# Patient Record
Sex: MALE | Race: WHITE
[De-identification: names, ages, dates, MRNs, and addresses within clinical notes are randomized per-mention and may not be internally consistent; named-entity substitution may affect disease eponyms.]

---

## 2019-06-15 ENCOUNTER — HOSPITAL ENCOUNTER (EMERGENCY)
Dept: HOSPITAL 10 - E/R | Age: 39
Discharge: HOME | End: 2019-06-15
Payer: MEDICAID

## 2019-06-15 ENCOUNTER — HOSPITAL ENCOUNTER (EMERGENCY)
Dept: HOSPITAL 91 - E/R | Age: 39
Discharge: HOME | End: 2019-06-15
Payer: MEDICAID

## 2019-06-15 VITALS
BODY MASS INDEX: 28.65 KG/M2 | HEIGHT: 60 IN | HEIGHT: 60 IN | BODY MASS INDEX: 28.65 KG/M2 | WEIGHT: 145.95 LBS | WEIGHT: 145.95 LBS

## 2019-06-15 VITALS — SYSTOLIC BLOOD PRESSURE: 129 MMHG | HEART RATE: 104 BPM | DIASTOLIC BLOOD PRESSURE: 77 MMHG | RESPIRATION RATE: 20 BRPM

## 2019-06-15 DIAGNOSIS — F29: ICD-10-CM

## 2019-06-15 DIAGNOSIS — L03.115: Primary | ICD-10-CM

## 2019-06-15 LAB
ACETAMINOPHEN: < 10 UG/ML (ref 10–30)
ADD MAN DIFF?: NO
ADD UMIC: NO
ALANINE AMINOTRANSFERASE: 20 IU/L (ref 13–69)
ALBUMIN/GLOBULIN RATIO: 1.25
ALBUMIN: 3.9 G/DL (ref 3.3–4.9)
ALKALINE PHOSPHATASE: 52 IU/L (ref 42–121)
ANION GAP: 7 (ref 5–13)
ASPARTATE AMINO TRANSFERASE: 24 IU/L (ref 15–46)
BASOPHIL #: 0.1 10^3/UL (ref 0–0.1)
BASOPHILS %: 0.6 % (ref 0–2)
BILIRUBIN,DIRECT: 0 MG/DL (ref 0–0.2)
BILIRUBIN,TOTAL: 0.3 MG/DL (ref 0.2–1.3)
BLOOD UREA NITROGEN: 14 MG/DL (ref 7–20)
CALCIUM: 9 MG/DL (ref 8.4–10.2)
CARBON DIOXIDE: 31 MMOL/L (ref 21–31)
CHLORIDE: 99 MMOL/L (ref 97–110)
CREATININE: 1.05 MG/DL (ref 0.61–1.24)
EOSINOPHILS #: 0.2 10^3/UL (ref 0–0.5)
EOSINOPHILS %: 1.5 % (ref 0–7)
ETHANOL: < 10 MG/DL (ref 0–0)
GLOBULIN: 3.1 G/DL (ref 1.3–3.2)
GLUCOSE: 107 MG/DL (ref 70–220)
HEMATOCRIT: 37.4 % (ref 42–52)
HEMOGLOBIN: 12.1 G/DL (ref 14–18)
IMMATURE GRANS #M: 0.04 10^3/UL (ref 0–0.03)
IMMATURE GRANS % (M): 0.3 % (ref 0–0.43)
LYMPHOCYTES #: 1.1 10^3/UL (ref 0.8–2.9)
LYMPHOCYTES %: 9.3 % (ref 15–51)
MEAN CORPUSCULAR HEMOGLOBIN: 28.8 PG (ref 29–33)
MEAN CORPUSCULAR HGB CONC: 32.4 G/DL (ref 32–37)
MEAN CORPUSCULAR VOLUME: 89 FL (ref 82–101)
MEAN PLATELET VOLUME: 9.2 FL (ref 7.4–10.4)
MONOCYTE #: 1.3 10^3/UL (ref 0.3–0.9)
MONOCYTES %: 11.4 % (ref 0–11)
NEUTROPHIL #: 9 10^3/UL (ref 1.6–7.5)
NEUTROPHILS %: 76.9 % (ref 39–77)
NUCLEATED RED BLOOD CELLS #: 0 10^3/UL (ref 0–0)
NUCLEATED RED BLOOD CELLS%: 0 /100WBC (ref 0–0)
PLATELET COUNT: 287 10^3/UL (ref 140–415)
POTASSIUM: 4.1 MMOL/L (ref 3.5–5.1)
RED BLOOD COUNT: 4.2 10^6/UL (ref 4.7–6.1)
RED CELL DISTRIBUTION WIDTH: 12.9 % (ref 11.5–14.5)
SALICYLATE: < 1 MG/DL (ref 5–30)
SODIUM: 137 MMOL/L (ref 135–144)
TOTAL PROTEIN: 7 G/DL (ref 6.1–8.1)
UR ASCORBIC ACID: NEGATIVE MG/DL
UR BILIRUBIN (DIP): NEGATIVE MG/DL
UR BLOOD (DIP): NEGATIVE MG/DL
UR CLARITY: CLEAR
UR COLOR: YELLOW
UR GLUCOSE (DIP): NEGATIVE MG/DL
UR KETONES (DIP): NEGATIVE MG/DL
UR LEUKOCYTE ESTERASE (DIP): NEGATIVE LEU/UL
UR NITRITE (DIP): NEGATIVE MG/DL
UR PH (DIP): 7 (ref 5–9)
UR SPECIFIC GRAVITY (DIP): 1.01 (ref 1–1.03)
UR TOTAL PROTEIN (DIP): NEGATIVE MG/DL
UR UROBILINOGEN (DIP): NEGATIVE MG/DL
WHITE BLOOD COUNT: 11.7 10^3/UL (ref 4.8–10.8)

## 2019-06-15 PROCEDURE — 85025 COMPLETE CBC W/AUTO DIFF WBC: CPT

## 2019-06-15 PROCEDURE — 36415 COLL VENOUS BLD VENIPUNCTURE: CPT

## 2019-06-15 PROCEDURE — 80307 DRUG TEST PRSMV CHEM ANLYZR: CPT

## 2019-06-15 PROCEDURE — 81003 URINALYSIS AUTO W/O SCOPE: CPT

## 2019-06-15 PROCEDURE — 99285 EMERGENCY DEPT VISIT HI MDM: CPT

## 2019-06-15 PROCEDURE — 80053 COMPREHEN METABOLIC PANEL: CPT

## 2019-06-15 RX ADMIN — SULFAMETHOXAZOLE AND TRIMETHOPRIM 1 TAB: 800; 160 TABLET ORAL at 19:42

## 2019-06-15 RX ADMIN — OLANZAPINE 1 MG: 5 TABLET, ORALLY DISINTEGRATING ORAL at 19:42

## 2019-06-15 RX ADMIN — CEPHALEXIN 1 MG: 500 CAPSULE ORAL at 19:42

## 2019-06-15 RX ADMIN — ACETAMINOPHEN 1 MG: 325 TABLET, FILM COATED ORAL at 21:37

## 2019-06-15 NOTE — ERD
ER Documentation


Chief Complaint


Chief Complaint





STATES ELCTRIC SHOCKS WITH USED CAR FOR THE LAST 5 MONTHS, SEE NOTES





HPI


Patient is a 38-year-old male who presents saying that he feels electrical 


shocks from his used car.  He has had the symptoms for the past 5 months.  He 


feels electrical shocks in his left upper abdomen.  He denies suicidal or 


homicidal ideation.  He also says that he has right-sided knee pain and redness 


that started a few days ago.  Upon review of old medical record the patient has 


multiple visits to the ER since 2013.  Review of the emergency department 


information exchange system shows visits to 2 separate emergency departments for


a total of 5 visits over the past 1 year.  He does not currently have a primary 


doctor.





ROS


All systems reviewed and are negative except as per history of present illness.





Medications


Home Meds


Active Scripts


Olanzapine* (Zyprexa*) 5 Mg Tablet, 5 MG PO DAILY, #14 TAB


   Prov:NIURKA GRACIA MD         6/15/19


Cephalexin* (Keflex*) 500 Mg Capsule, 500 MG PO QID for 7 Days, CAP


   Prov:NIURKA GARCIA MD         6/15/19


Sulfamethoxazole/Trimethoprim* (Bactrim Ds* Tablet) 1 Each Tablet, 1 TAB PO BID,


#14 TAB


   Prov:NIURKA GARCIA MD         6/15/19


Reported Medications


[Lorazapam]   No Conflict Check


   9/21/13


[None]   No Conflict Check


   8/11/13





Allergies


Allergies:  


Coded Allergies:  


     No Known Allergy (Unverified , 9/21/13)





PMhx/Soc


Medical and Surgical Hx:  pt denies Medical Hx, pt denies Surgical Hx


History of Surgery:  No


Anesthesia Reaction:  No


Hx Neurological Disorder:  No


Hx Respiratory Disorders:  No


Hx Cardiac Disorders:  No


Hx Psychiatric Problems:  Yes (ANXIETY)


Hx Miscellaneous Medical Probl:  No


Hx Alcohol Use:  No (denies )


Hx Substance Use:  No (denies)


Hx Tobacco Use:  No (denies )


Smoking Status:  Never smoker





FmHx


Family History:  diabetes





Physical Exam


Vitals





Vital Signs


  Date      Temp  Pulse  Resp  B/P (MAP)   Pulse Ox  O2          O2 Flow    FiO2


Time                                                 Delivery    Rate


   6/15/19  99.4    107    20      143/88        98


     19:20                          (106)





Physical Exam


Const:   No acute distress


Head:   Atraumatic 


Eyes:    Normal Conjunctiva


ENT:    Normal External Ears, Nose and Mouth.


Neck:               Full range of motion. No meningismus.


Resp:   Clear to auscultation bilaterally


Cardio:   Regular rate and rhythm, no murmurs


Abd:    Soft, non tender, non distended. Normal bowel sounds


Skin:   Cellulitis in the inferior portion of the right knee without signs of 


joint infection, patient has full range of motion without pain and no joint 


effusion


Back:   No midline or flank tenderness


Ext:    No cyanosis, or edema


Neur:   Awake and alert


Psych:    Flight of ideas and delusions but no suicidal or homicidal ideation


Result Diagram:  


6/15/19 1946                                                                    


            6/15/19 1946





Results 24 hrs





Laboratory Tests


              Test
                                  6/15/19
19:46


              White Blood Count                      11.7 10^3/ul


              Red Blood Count                        4.20 10^6/ul


              Hemoglobin                                12.1 g/dl


              Hematocrit                                   37.4 %


              Mean Corpuscular Volume                     89.0 fl


              Mean Corpuscular Hemoglobin                 28.8 pg


              Mean Corpuscular Hemoglobin
Concent      32.4 g/dl 



              Red Cell Distribution Width                  12.9 %


              Platelet Count                          287 10^3/UL


              Mean Platelet Volume                         9.2 fl


              Immature Granulocytes %                     0.300 %


              Neutrophils %                                76.9 %


              Lymphocytes %                                 9.3 %


              Monocytes %                                  11.4 %


              Eosinophils %                                 1.5 %


              Basophils %                                   0.6 %


              Nucleated Red Blood Cells %             0.0 /100WBC


              Immature Granulocytes #               0.040 10^3/ul


              Neutrophils #                           9.0 10^3/ul


              Lymphocytes #                           1.1 10^3/ul


              Monocytes #                             1.3 10^3/ul


              Eosinophils #                           0.2 10^3/ul


              Basophils #                             0.1 10^3/ul


              Nucleated Red Blood Cells #             0.0 10^3/ul


              Urine Color                          YELLOW


              Urine Clarity                        CLEAR


              Urine pH                                        7.0


              Urine Specific Gravity                        1.015


              Urine Ketones                        NEGATIVE mg/dL


              Urine Nitrite                        NEGATIVE mg/dL


              Urine Bilirubin                      NEGATIVE mg/dL


              Urine Urobilinogen                   NEGATIVE mg/dL


              Urine Leukocyte Esterase
            NEGATIVE
Tala/ul


              Urine Hemoglobin                     NEGATIVE mg/dL


              Urine Glucose                        NEGATIVE mg/dL


              Urine Total Protein                  NEGATIVE mg/dl


              Sodium Level                             137 mmol/L


              Potassium Level                          4.1 mmol/L


              Chloride Level                            99 mmol/L


              Carbon Dioxide Level                      31 mmol/L


              Anion Gap                                         7


              Blood Urea Nitrogen                        14 mg/dl


              Creatinine                               1.05 mg/dl


              Est Glomerular Filtrat Rate
mL/min   > 60 mL/min 



              Glucose Level                             107 mg/dl


              Calcium Level                             9.0 mg/dl


              Total Bilirubin                           0.3 mg/dl


              Direct Bilirubin                         0.00 mg/dl


              Indirect Bilirubin                        0.3 mg/dl


              Aspartate Amino Transf
(AST/SGOT)          24 IU/L 



              Alanine Aminotransferase
(ALT/SGPT)        20 IU/L 



              Alkaline Phosphatase                        52 IU/L


              Total Protein                              7.0 g/dl


              Albumin                                    3.9 g/dl


              Globulin                                  3.10 g/dl


              Albumin/Globulin Ratio                         1.25


              Salicylates Level                    < 1.0 mg/dl


              Urine Opiates Screen                 Negative


              Acetaminophen Level                  < 10.0 ug/ml


              Urine Barbiturates                   Negative


              Urine Amphetamines Screen            Negative


              Urine Benzodiazepines Screen         Negative


              Urine Cocaine Screen                 Negative


              Urine Cannabinoids                   Negative


              Ethyl Alcohol Level                  < 10.0 mg/dl





Current Medications


 Medications
   Dose
          Sig/Rachael
       Start Time
   Status  Last


 (Trade)       Ordered        Route
 PRN     Stop Time              Admin
Dose


                              Reason                                Admin


 Cephalexin
    500 mg         QID
 PO
       6/15/19                    6/15/19


(Keflex)                                     19:35
                       19:42



                1 tab          BID
 PO
       6/15/19                    6/15/19


Trimethoprim/                                20:00
                       19:42







Sulfamethoxaz


ole



(Bactrim


(Ds))


 Olanzapine
    5 mg           ONCE  ONCE
    6/15/19       DC           6/15/19


(Zyprexa                      ODT
           20:00
                       19:42



Zydis)                                       6/15/19 20:01








Procedures/MDM


Patient is a 38-year-old male who presents with acute psychosis with delusions. 


 He was given Zyprexa.  He was seen by psychiatry who did not feel the patient 


requires a 5150 hold at this time.  He does not appear to be a danger to himself


 or to others.  He did have a cellulitis of the right knee and will be given 


Bactrim and Keflex for 1 week.  I do not believe this is an intra-articular 


infection.  The patient will need to follow-up within 48 hours for a wound 


check.  He will be given information for the local clinics.  He also be given 


information for the local psychiatric resources so that he can follow-up.  He 


will be given Zyprexa per psychiatry.  The patient could return for any worse


inder symptoms.





Departure


Diagnosis:  


   Primary Impression:  


   Psychosis


   Psychosis type:  unspecified psychosis type  Qualified Codes:  F29 - 


   Unspecified psychosis not due to a substance or known physiological condition


   Additional Impression:  


   Cellulitis


   Site of cellulitis:  extremity  Site of cellulitis of extremity:  lower 


   extremity  Laterality:  right  Qualified Codes:  L03.115 - Cellulitis of 


   right lower limb


Condition:  Fair


Patient Instructions:  Cellulitis, Psychosis


Referrals:  


[facility]  ([facility])


Usted se ha hecho un examen mdico de control que le indica que no est en fatmata 


condicin que requiera tratamiento urgente en el Departamento de Emergencia. Un 


estudio ms profundo y el tratamiento de boggs condicin pueden esperar sin ningn 


riesgo hasta que usted sea atendida/o en el consultorio de boggs mdico o fatmata cl


wai. Es responsabilidad suya arreglar fatmata kirti para el seguimiento del joann. 





MANEJO DE CONDICIONES NO URGENTES EN EL FUTURO


1) Si usted tiene un mdico de atencin primaria:





Usted debera llamar a boggs mdico de atencin primaria antes de venir al 


departamento de emergencia. Despus de las horas de consultorio, boggs doctor o boggs 


asociado/a est disponible por telfono. El mdico o enfermero de leonie en el 


servicio telefnico puede asesorarle por soledad medio para atender el problema, o 


joann contrario se puede programar fatmata kirti.





2) Si usted no tiene un mdico de atencin primaria:


Llame al mdico o clnica de referencia que aparece abajo missy las horas de 


consultorio para hacer fatmata kirti para que le vean.





CLINICAS:


[facility]  [phone] [address] [facility]  [phone] [address] [facility] [phone] [address] [facility]. [facility]  [phone] [address] [place] [phone] [address] [facility]. [phone] 


[address]





Additional Instructions:  


Llame al doctor MAANA y jakob fatmata KIRTI PARA DENTRO DE 1-2 EDWARDS.Dgale a la 


secretaria que nosotros le instruimos hacer esta kirti.Avise o llame si boggs condic


in se empeora antes de la kirti. Regresa aqui si peor o no mejor.











NIURKA GARCIA MD                [place] 15, 2019 20:56

## 2019-06-15 NOTE — PSY
Date/Time of Note


Date/Time of Note


DATE: 6/15/19 


TIME: 20:44





Psychiatric Subjective Eval


Consent


Pt consented to telemedicine:  Yes





Subjective Evaluation


Patient location:  emergency


Chief Complaint:  STATES ELCTRIC SHOCKS WITH USED CAR FOR THE LAST 5 MONTHS, SEE


NOTES


Reason for consult:  Hallucinations and delusions.


History of present illness


This is a 38 year old  [place]ic speaking male who presented to the ED by 


himself complaining that he was receiving messages from a car that he purchased 


about 6 months ago. He states that the computer in the car is communicating to h


is and is speaking through his abdomen. He also reports that he has been having 


problems with sleep. He said that he works as a , and only experience 


receiving messages from this specific car that he purchased. He denies substance


use or alcohol use. He denies suicidal or homicidal ideation.


Past psychiatric history


He denied any prior psychiatric care. He denied any prior suicide attempts. He 


denied any psychiatric admissions.


Hospitalization:  no


Family History


He denies any family history of mental illness.


Medical history


Problems


Medical Problems:


(1) Acute psychosis


Status: Acute  





(2) Chest pain


Status: Acute  





(3) Patient left after triage


Status: Acute  





Allergies:  


Coded Allergies:  


     No Known Allergy (Unverified , 9/21/13)





Substance Abuse


Substance use:  No known substance abuse


Substance abuse history:  No


Prior substance abuse treatmen:  No





Social History


Marital status:  single


Level of education:  Tech training


DPA/Conservatorship:  No


Occupation/senior care:  





Psychiatric Objective Eval


Review of Systems:


Review of Systems:  Applicable


Constitutional:  Normal


Eyes:  Normal


ENT:  Normal


Neck:  Normal


Respiratory:  Normal


Chest/Breast:  Normal


Cardiovascular:  Normal


GI:  Abnormal


Genitourinary:  Normal


Skin:  Normal


Lymphatic:  Normal


Musculoskeletal:  Abnormal


Neurological:  Normal


Other:


Abdominal pain, Knee pain





Physical Examination:


Physical Examination:  Applicable


Sleep:  Insomnia


Appetite:  Adequate


Energy:  Increased





Mental Status Examination:


Appearance:  Groomed


Eye Contact:  Good


Psychomotor Activity:  Normal


Behavior:  Cooperative


Speech:  Clear


AFFECT:  Anxious


Mood:  Anxious


Though Process:  Linear


Thought Content:  Delusions, Hallucinations


Suicidal:  No


Homicidal:  No


On 72 hour hold:  No


Orientation:  x4


Cognition:  Alert


Insight:  Mild


Judgement:  Mild


Attention Span:  Intact


Laboratory Results





Laboratory Tests


              Test
                                  6/15/19
19:46


              White Blood Count                      11.7 10^3/ul


              Red Blood Count                        4.20 10^6/ul


              Hemoglobin                                12.1 g/dl


              Hematocrit                                   37.4 %


              Mean Corpuscular Volume                     89.0 fl


              Mean Corpuscular Hemoglobin                 28.8 pg


              Mean Corpuscular Hemoglobin
Concent      32.4 g/dl 



              Red Cell Distribution Width                  12.9 %


              Platelet Count                          287 10^3/UL


              Mean Platelet Volume                         9.2 fl


              Immature Granulocytes %                     0.300 %


              Neutrophils %                                76.9 %


              Lymphocytes %                                 9.3 %


              Monocytes %                                  11.4 %


              Eosinophils %                                 1.5 %


              Basophils %                                   0.6 %


              Nucleated Red Blood Cells %             0.0 /100WBC


              Immature Granulocytes #               0.040 10^3/ul


              Neutrophils #                           9.0 10^3/ul


              Lymphocytes #                           1.1 10^3/ul


              Monocytes #                             1.3 10^3/ul


              Eosinophils #                           0.2 10^3/ul


              Basophils #                             0.1 10^3/ul


              Nucleated Red Blood Cells #             0.0 10^3/ul


              Urine Color                          YELLOW


              Urine Clarity                        CLEAR


              Urine pH                                        7.0


              Urine Specific Gravity                        1.015


              Urine Ketones                        NEGATIVE mg/dL


              Urine Nitrite                        NEGATIVE mg/dL


              Urine Bilirubin                      NEGATIVE mg/dL


              Urine Urobilinogen                   NEGATIVE mg/dL


              Urine Leukocyte Esterase
            NEGATIVE
Tala/ul


              Urine Hemoglobin                     NEGATIVE mg/dL


              Urine Glucose                        NEGATIVE mg/dL


              Urine Total Protein                  NEGATIVE mg/dl


              Sodium Level                             137 mmol/L


              Potassium Level                          4.1 mmol/L


              Chloride Level                            99 mmol/L


              Carbon Dioxide Level                      31 mmol/L


              Anion Gap                                         7


              Blood Urea Nitrogen                        14 mg/dl


              Creatinine                               1.05 mg/dl


              Est Glomerular Filtrat Rate
mL/min   > 60 mL/min 



              Glucose Level                             107 mg/dl


              Calcium Level                             9.0 mg/dl


              Total Bilirubin                           0.3 mg/dl


              Direct Bilirubin                         0.00 mg/dl


              Indirect Bilirubin                        0.3 mg/dl


              Aspartate Amino Transf
(AST/SGOT)          24 IU/L 



              Alanine Aminotransferase
(ALT/SGPT)        20 IU/L 



              Alkaline Phosphatase                        52 IU/L


              Total Protein                              7.0 g/dl


              Albumin                                    3.9 g/dl


              Globulin                                  3.10 g/dl


              Albumin/Globulin Ratio                         1.25


              Salicylates Level                    < 1.0 mg/dl


              Urine Opiates Screen                 Negative


              Acetaminophen Level                  < 10.0 ug/ml


              Urine Barbiturates                   Negative


              Urine Amphetamines Screen            Negative


              Urine Benzodiazepines Screen         Negative


              Urine Cocaine Screen                 Negative


              Urine Cannabinoids                   Negative


              Ethyl Alcohol Level                  < 10.0 mg/dl








Assessment and Plan


Assessment/Diagnosis


Diagnosis


F29.0 Psychosis NOS





Recommendation/Plan


Medication Management


Olanzapine 10mg po pm #30 referred to psychiatrist.


Multiple antipsychotics:  No


Pt. Caregiver/Family Education


The patient was receptive to receive feedback that his presenting symptoms are 


delusions and hallucinations. He has been living with his sister, who has been 


saying the same thing. He is receptive to take medications. He denies 


hallucinations or delusions.


Discharge Disposition:  Community (home)


Legal Status:  Voluntary











MADELIN NORTH MD     [place] 15, 2019 20:57